# Patient Record
Sex: MALE | Race: OTHER | Employment: UNEMPLOYED | URBAN - METROPOLITAN AREA
[De-identification: names, ages, dates, MRNs, and addresses within clinical notes are randomized per-mention and may not be internally consistent; named-entity substitution may affect disease eponyms.]

---

## 2021-12-28 ENCOUNTER — HOSPITAL ENCOUNTER (OUTPATIENT)
Age: 46
Setting detail: OBSERVATION
Discharge: HOME | End: 2021-12-29
Attending: EMERGENCY MEDICINE | Admitting: SURGERY
Payer: MEDICAID

## 2021-12-28 ENCOUNTER — ANESTHESIA (OUTPATIENT)
Dept: OPERATING ROOM | Age: 46
End: 2021-12-28
Payer: MEDICAID

## 2021-12-28 ENCOUNTER — APPOINTMENT (OUTPATIENT)
Dept: CT IMAGING | Age: 46
End: 2021-12-28
Payer: MEDICAID

## 2021-12-28 ENCOUNTER — ANESTHESIA EVENT (OUTPATIENT)
Dept: OPERATING ROOM | Age: 46
End: 2021-12-28
Payer: MEDICAID

## 2021-12-28 VITALS
DIASTOLIC BLOOD PRESSURE: 64 MMHG | TEMPERATURE: 98.2 F | OXYGEN SATURATION: 100 % | SYSTOLIC BLOOD PRESSURE: 111 MMHG | RESPIRATION RATE: 15 BRPM

## 2021-12-28 DIAGNOSIS — K35.20 ACUTE APPENDICITIS WITH GENERALIZED PERITONITIS, WITHOUT GANGRENE OR ABSCESS, UNSPECIFIED WHETHER PERFORATION PRESENT: Primary | ICD-10-CM

## 2021-12-28 DIAGNOSIS — D72.829 LEUKOCYTOSIS, UNSPECIFIED TYPE: ICD-10-CM

## 2021-12-28 PROBLEM — K35.80 ACUTE APPENDICITIS: Status: ACTIVE | Noted: 2021-12-28

## 2021-12-28 LAB
ALBUMIN SERPL-MCNC: 4.7 G/DL (ref 3.4–5)
ALP BLD-CCNC: 123 U/L (ref 40–129)
ALT SERPL-CCNC: 71 U/L (ref 10–40)
ANION GAP SERPL CALCULATED.3IONS-SCNC: 14 MMOL/L (ref 3–16)
AST SERPL-CCNC: 31 U/L (ref 15–37)
BASOPHILS ABSOLUTE: 0 K/UL (ref 0–0.2)
BASOPHILS RELATIVE PERCENT: 0.3 %
BILIRUB SERPL-MCNC: 0.3 MG/DL (ref 0–1)
BILIRUBIN DIRECT: <0.2 MG/DL (ref 0–0.3)
BILIRUBIN URINE: NEGATIVE
BILIRUBIN, INDIRECT: ABNORMAL MG/DL (ref 0–1)
BLOOD, URINE: ABNORMAL
BUN BLDV-MCNC: 14 MG/DL (ref 7–20)
CALCIUM SERPL-MCNC: 9.4 MG/DL (ref 8.3–10.6)
CHLORIDE BLD-SCNC: 101 MMOL/L (ref 99–110)
CLARITY: ABNORMAL
CO2: 25 MMOL/L (ref 21–32)
COLOR: YELLOW
CREAT SERPL-MCNC: 0.8 MG/DL (ref 0.9–1.3)
EOSINOPHILS ABSOLUTE: 0 K/UL (ref 0–0.6)
EOSINOPHILS RELATIVE PERCENT: 0.2 %
EPITHELIAL CELLS, UA: NORMAL /HPF (ref 0–5)
GFR AFRICAN AMERICAN: >60
GFR NON-AFRICAN AMERICAN: >60
GLUCOSE BLD-MCNC: 108 MG/DL (ref 70–99)
GLUCOSE URINE: NEGATIVE MG/DL
HCT VFR BLD CALC: 41.4 % (ref 40.5–52.5)
HEMOGLOBIN: 13.9 G/DL (ref 13.5–17.5)
KETONES, URINE: NEGATIVE MG/DL
LEUKOCYTE ESTERASE, URINE: NEGATIVE
LIPASE: 18 U/L (ref 13–60)
LYMPHOCYTES ABSOLUTE: 1.3 K/UL (ref 1–5.1)
LYMPHOCYTES RELATIVE PERCENT: 8.5 %
MCH RBC QN AUTO: 28.5 PG (ref 26–34)
MCHC RBC AUTO-ENTMCNC: 33.6 G/DL (ref 31–36)
MCV RBC AUTO: 84.7 FL (ref 80–100)
MICROSCOPIC EXAMINATION: YES
MONOCYTES ABSOLUTE: 1.1 K/UL (ref 0–1.3)
MONOCYTES RELATIVE PERCENT: 7.1 %
NEUTROPHILS ABSOLUTE: 12.5 K/UL (ref 1.7–7.7)
NEUTROPHILS RELATIVE PERCENT: 83.9 %
NITRITE, URINE: NEGATIVE
PDW BLD-RTO: 12.9 % (ref 12.4–15.4)
PH UA: 6 (ref 5–8)
PLATELET # BLD: 263 K/UL (ref 135–450)
PMV BLD AUTO: 10 FL (ref 5–10.5)
POTASSIUM REFLEX MAGNESIUM: 3.8 MMOL/L (ref 3.5–5.1)
PROTEIN UA: 100 MG/DL
RBC # BLD: 4.89 M/UL (ref 4.2–5.9)
RBC UA: NORMAL /HPF (ref 0–4)
SARS-COV-2, NAAT: NOT DETECTED
SODIUM BLD-SCNC: 140 MMOL/L (ref 136–145)
SPECIFIC GRAVITY UA: >=1.03 (ref 1–1.03)
TOTAL PROTEIN: 7.9 G/DL (ref 6.4–8.2)
TRICHOMONAS: NORMAL /HPF
URINE REFLEX TO CULTURE: ABNORMAL
URINE TYPE: ABNORMAL
UROBILINOGEN, URINE: 0.2 E.U./DL
WBC # BLD: 14.9 K/UL (ref 4–11)
WBC UA: NORMAL /HPF (ref 0–5)

## 2021-12-28 PROCEDURE — 2720000010 HC SURG SUPPLY STERILE: Performed by: SURGERY

## 2021-12-28 PROCEDURE — 6360000002 HC RX W HCPCS: Performed by: EMERGENCY MEDICINE

## 2021-12-28 PROCEDURE — 6370000000 HC RX 637 (ALT 250 FOR IP): Performed by: SURGERY

## 2021-12-28 PROCEDURE — 94761 N-INVAS EAR/PLS OXIMETRY MLT: CPT

## 2021-12-28 PROCEDURE — 3600000014 HC SURGERY LEVEL 4 ADDTL 15MIN: Performed by: SURGERY

## 2021-12-28 PROCEDURE — 2580000003 HC RX 258: Performed by: ANESTHESIOLOGY

## 2021-12-28 PROCEDURE — 7100000000 HC PACU RECOVERY - FIRST 15 MIN: Performed by: SURGERY

## 2021-12-28 PROCEDURE — 85025 COMPLETE CBC W/AUTO DIFF WBC: CPT

## 2021-12-28 PROCEDURE — 2580000003 HC RX 258: Performed by: EMERGENCY MEDICINE

## 2021-12-28 PROCEDURE — 2580000003 HC RX 258: Performed by: SURGERY

## 2021-12-28 PROCEDURE — G0378 HOSPITAL OBSERVATION PER HR: HCPCS

## 2021-12-28 PROCEDURE — 2580000003 HC RX 258: Performed by: PHYSICIAN ASSISTANT

## 2021-12-28 PROCEDURE — 36415 COLL VENOUS BLD VENIPUNCTURE: CPT

## 2021-12-28 PROCEDURE — 3600000004 HC SURGERY LEVEL 4 BASE: Performed by: SURGERY

## 2021-12-28 PROCEDURE — 99283 EMERGENCY DEPT VISIT LOW MDM: CPT

## 2021-12-28 PROCEDURE — 2500000003 HC RX 250 WO HCPCS: Performed by: SURGERY

## 2021-12-28 PROCEDURE — 96374 THER/PROPH/DIAG INJ IV PUSH: CPT

## 2021-12-28 PROCEDURE — 74177 CT ABD & PELVIS W/CONTRAST: CPT

## 2021-12-28 PROCEDURE — 80076 HEPATIC FUNCTION PANEL: CPT

## 2021-12-28 PROCEDURE — 80048 BASIC METABOLIC PNL TOTAL CA: CPT

## 2021-12-28 PROCEDURE — 6360000002 HC RX W HCPCS: Performed by: SURGERY

## 2021-12-28 PROCEDURE — 2500000003 HC RX 250 WO HCPCS

## 2021-12-28 PROCEDURE — 88304 TISSUE EXAM BY PATHOLOGIST: CPT

## 2021-12-28 PROCEDURE — 2500000003 HC RX 250 WO HCPCS: Performed by: ANESTHESIOLOGY

## 2021-12-28 PROCEDURE — 3700000000 HC ANESTHESIA ATTENDED CARE: Performed by: SURGERY

## 2021-12-28 PROCEDURE — 6360000002 HC RX W HCPCS: Performed by: ANESTHESIOLOGY

## 2021-12-28 PROCEDURE — 44970 LAPAROSCOPY APPENDECTOMY: CPT | Performed by: SURGERY

## 2021-12-28 PROCEDURE — 81001 URINALYSIS AUTO W/SCOPE: CPT

## 2021-12-28 PROCEDURE — 2709999900 HC NON-CHARGEABLE SUPPLY: Performed by: SURGERY

## 2021-12-28 PROCEDURE — 83690 ASSAY OF LIPASE: CPT

## 2021-12-28 PROCEDURE — 3700000001 HC ADD 15 MINUTES (ANESTHESIA): Performed by: SURGERY

## 2021-12-28 PROCEDURE — 99219 PR INITIAL OBSERVATION CARE/DAY 50 MINUTES: CPT | Performed by: SURGERY

## 2021-12-28 PROCEDURE — 6360000004 HC RX CONTRAST MEDICATION: Performed by: EMERGENCY MEDICINE

## 2021-12-28 PROCEDURE — 87635 SARS-COV-2 COVID-19 AMP PRB: CPT

## 2021-12-28 PROCEDURE — 7100000001 HC PACU RECOVERY - ADDTL 15 MIN: Performed by: SURGERY

## 2021-12-28 PROCEDURE — 6360000004 HC RX CONTRAST MEDICATION: Performed by: PHYSICIAN ASSISTANT

## 2021-12-28 RX ORDER — ROCURONIUM BROMIDE 10 MG/ML
INJECTION, SOLUTION INTRAVENOUS PRN
Status: DISCONTINUED | OUTPATIENT
Start: 2021-12-28 | End: 2021-12-28 | Stop reason: SDUPTHER

## 2021-12-28 RX ORDER — ACETAMINOPHEN 325 MG/1
650 TABLET ORAL EVERY 6 HOURS
Status: DISCONTINUED | OUTPATIENT
Start: 2021-12-28 | End: 2021-12-29 | Stop reason: HOSPADM

## 2021-12-28 RX ORDER — OXYCODONE HYDROCHLORIDE 10 MG/1
10 TABLET ORAL EVERY 4 HOURS PRN
Status: DISCONTINUED | OUTPATIENT
Start: 2021-12-28 | End: 2021-12-29 | Stop reason: HOSPADM

## 2021-12-28 RX ORDER — ONDANSETRON 4 MG/1
4 TABLET, ORALLY DISINTEGRATING ORAL EVERY 8 HOURS PRN
Status: DISCONTINUED | OUTPATIENT
Start: 2021-12-28 | End: 2021-12-29 | Stop reason: HOSPADM

## 2021-12-28 RX ORDER — IBUPROFEN 600 MG/1
600 TABLET ORAL EVERY 6 HOURS
Status: DISCONTINUED | OUTPATIENT
Start: 2021-12-29 | End: 2021-12-29 | Stop reason: HOSPADM

## 2021-12-28 RX ORDER — SODIUM CHLORIDE 0.9 % (FLUSH) 0.9 %
5-40 SYRINGE (ML) INJECTION EVERY 12 HOURS SCHEDULED
Status: DISCONTINUED | OUTPATIENT
Start: 2021-12-28 | End: 2021-12-29 | Stop reason: HOSPADM

## 2021-12-28 RX ORDER — 0.9 % SODIUM CHLORIDE 0.9 %
1000 INTRAVENOUS SOLUTION INTRAVENOUS ONCE
Status: COMPLETED | OUTPATIENT
Start: 2021-12-28 | End: 2021-12-28

## 2021-12-28 RX ORDER — PROPOFOL 10 MG/ML
INJECTION, EMULSION INTRAVENOUS PRN
Status: DISCONTINUED | OUTPATIENT
Start: 2021-12-28 | End: 2021-12-28 | Stop reason: SDUPTHER

## 2021-12-28 RX ORDER — ONDANSETRON 2 MG/ML
INJECTION INTRAMUSCULAR; INTRAVENOUS PRN
Status: DISCONTINUED | OUTPATIENT
Start: 2021-12-28 | End: 2021-12-28 | Stop reason: SDUPTHER

## 2021-12-28 RX ORDER — POTASSIUM CHLORIDE 7.45 MG/ML
10 INJECTION INTRAVENOUS PRN
Status: DISCONTINUED | OUTPATIENT
Start: 2021-12-28 | End: 2021-12-29 | Stop reason: HOSPADM

## 2021-12-28 RX ORDER — KETOROLAC TROMETHAMINE 30 MG/ML
INJECTION, SOLUTION INTRAMUSCULAR; INTRAVENOUS PRN
Status: DISCONTINUED | OUTPATIENT
Start: 2021-12-28 | End: 2021-12-28 | Stop reason: SDUPTHER

## 2021-12-28 RX ORDER — ONDANSETRON 2 MG/ML
4 INJECTION INTRAMUSCULAR; INTRAVENOUS EVERY 6 HOURS PRN
Status: DISCONTINUED | OUTPATIENT
Start: 2021-12-28 | End: 2021-12-29 | Stop reason: HOSPADM

## 2021-12-28 RX ORDER — BUPIVACAINE HYDROCHLORIDE 5 MG/ML
INJECTION, SOLUTION EPIDURAL; INTRACAUDAL
Status: COMPLETED | OUTPATIENT
Start: 2021-12-28 | End: 2021-12-28

## 2021-12-28 RX ORDER — SODIUM CHLORIDE 9 MG/ML
25 INJECTION, SOLUTION INTRAVENOUS PRN
Status: DISCONTINUED | OUTPATIENT
Start: 2021-12-28 | End: 2021-12-29 | Stop reason: HOSPADM

## 2021-12-28 RX ORDER — SODIUM CHLORIDE 9 MG/ML
INJECTION, SOLUTION INTRAVENOUS CONTINUOUS PRN
Status: DISCONTINUED | OUTPATIENT
Start: 2021-12-28 | End: 2021-12-28 | Stop reason: SDUPTHER

## 2021-12-28 RX ORDER — SENNA AND DOCUSATE SODIUM 50; 8.6 MG/1; MG/1
1 TABLET, FILM COATED ORAL 2 TIMES DAILY
Status: DISCONTINUED | OUTPATIENT
Start: 2021-12-28 | End: 2021-12-29 | Stop reason: HOSPADM

## 2021-12-28 RX ORDER — OXYCODONE HYDROCHLORIDE 5 MG/1
5 TABLET ORAL EVERY 4 HOURS PRN
Status: DISCONTINUED | OUTPATIENT
Start: 2021-12-28 | End: 2021-12-29 | Stop reason: HOSPADM

## 2021-12-28 RX ORDER — MORPHINE SULFATE 2 MG/ML
2 INJECTION, SOLUTION INTRAMUSCULAR; INTRAVENOUS
Status: DISCONTINUED | OUTPATIENT
Start: 2021-12-28 | End: 2021-12-29 | Stop reason: HOSPADM

## 2021-12-28 RX ORDER — MAGNESIUM HYDROXIDE 1200 MG/15ML
LIQUID ORAL CONTINUOUS PRN
Status: COMPLETED | OUTPATIENT
Start: 2021-12-28 | End: 2021-12-28

## 2021-12-28 RX ORDER — KETOROLAC TROMETHAMINE 30 MG/ML
15 INJECTION, SOLUTION INTRAMUSCULAR; INTRAVENOUS ONCE
Status: COMPLETED | OUTPATIENT
Start: 2021-12-28 | End: 2021-12-28

## 2021-12-28 RX ORDER — DEXTROSE, SODIUM CHLORIDE, AND POTASSIUM CHLORIDE 5; .45; .15 G/100ML; G/100ML; G/100ML
INJECTION INTRAVENOUS CONTINUOUS
Status: DISCONTINUED | OUTPATIENT
Start: 2021-12-28 | End: 2021-12-29 | Stop reason: HOSPADM

## 2021-12-28 RX ORDER — SODIUM CHLORIDE 0.9 % (FLUSH) 0.9 %
5-40 SYRINGE (ML) INJECTION PRN
Status: DISCONTINUED | OUTPATIENT
Start: 2021-12-28 | End: 2021-12-29 | Stop reason: HOSPADM

## 2021-12-28 RX ORDER — DEXAMETHASONE SODIUM PHOSPHATE 4 MG/ML
INJECTION, SOLUTION INTRA-ARTICULAR; INTRALESIONAL; INTRAMUSCULAR; INTRAVENOUS; SOFT TISSUE PRN
Status: DISCONTINUED | OUTPATIENT
Start: 2021-12-28 | End: 2021-12-28 | Stop reason: SDUPTHER

## 2021-12-28 RX ORDER — MAGNESIUM SULFATE 1 G/100ML
1000 INJECTION INTRAVENOUS PRN
Status: DISCONTINUED | OUTPATIENT
Start: 2021-12-28 | End: 2021-12-29 | Stop reason: HOSPADM

## 2021-12-28 RX ORDER — FENTANYL CITRATE 50 UG/ML
INJECTION, SOLUTION INTRAMUSCULAR; INTRAVENOUS PRN
Status: DISCONTINUED | OUTPATIENT
Start: 2021-12-28 | End: 2021-12-28 | Stop reason: SDUPTHER

## 2021-12-28 RX ORDER — MORPHINE SULFATE 4 MG/ML
4 INJECTION, SOLUTION INTRAMUSCULAR; INTRAVENOUS
Status: DISCONTINUED | OUTPATIENT
Start: 2021-12-28 | End: 2021-12-29 | Stop reason: HOSPADM

## 2021-12-28 RX ADMIN — SUGAMMADEX 300 MG: 100 INJECTION, SOLUTION INTRAVENOUS at 18:49

## 2021-12-28 RX ADMIN — IOHEXOL 50 ML: 240 INJECTION, SOLUTION INTRATHECAL; INTRAVASCULAR; INTRAVENOUS; ORAL at 13:41

## 2021-12-28 RX ADMIN — ACETAMINOPHEN 650 MG: 325 TABLET ORAL at 21:00

## 2021-12-28 RX ADMIN — SODIUM CHLORIDE: 9 INJECTION, SOLUTION INTRAVENOUS at 18:02

## 2021-12-28 RX ADMIN — SENNOSIDES AND DOCUSATE SODIUM 1 TABLET: 50; 8.6 TABLET ORAL at 21:00

## 2021-12-28 RX ADMIN — MORPHINE SULFATE 2 MG: 2 INJECTION, SOLUTION INTRAMUSCULAR; INTRAVENOUS at 21:00

## 2021-12-28 RX ADMIN — SODIUM CHLORIDE 1000 ML: 9 INJECTION, SOLUTION INTRAVENOUS at 13:58

## 2021-12-28 RX ADMIN — IOPAMIDOL 100 ML: 755 INJECTION, SOLUTION INTRAVENOUS at 14:51

## 2021-12-28 RX ADMIN — KETOROLAC TROMETHAMINE 30 MG: 30 INJECTION, SOLUTION INTRAMUSCULAR at 18:39

## 2021-12-28 RX ADMIN — DEXAMETHASONE SODIUM PHOSPHATE 8 MG: 4 INJECTION, SOLUTION INTRAMUSCULAR; INTRAVENOUS at 18:11

## 2021-12-28 RX ADMIN — FENTANYL CITRATE 100 MCG: 50 INJECTION INTRAMUSCULAR; INTRAVENOUS at 18:05

## 2021-12-28 RX ADMIN — SODIUM CHLORIDE: 9 INJECTION, SOLUTION INTRAVENOUS at 18:39

## 2021-12-28 RX ADMIN — ONDANSETRON 4 MG: 2 INJECTION INTRAMUSCULAR; INTRAVENOUS at 18:37

## 2021-12-28 RX ADMIN — KETOROLAC TROMETHAMINE 15 MG: 30 INJECTION, SOLUTION INTRAMUSCULAR at 13:58

## 2021-12-28 RX ADMIN — PIPERACILLIN AND TAZOBACTAM 3375 MG: 3; .375 INJECTION, POWDER, LYOPHILIZED, FOR SOLUTION INTRAVENOUS at 16:15

## 2021-12-28 RX ADMIN — POTASSIUM CHLORIDE, DEXTROSE MONOHYDRATE AND SODIUM CHLORIDE: 150; 5; 450 INJECTION, SOLUTION INTRAVENOUS at 21:03

## 2021-12-28 RX ADMIN — ROCURONIUM BROMIDE 30 MG: 10 SOLUTION INTRAVENOUS at 18:19

## 2021-12-28 RX ADMIN — ROCURONIUM BROMIDE 40 MG: 10 SOLUTION INTRAVENOUS at 18:05

## 2021-12-28 RX ADMIN — SODIUM CHLORIDE, PRESERVATIVE FREE 10 ML: 5 INJECTION INTRAVENOUS at 21:04

## 2021-12-28 RX ADMIN — PROPOFOL 200 MG: 10 INJECTION, EMULSION INTRAVENOUS at 18:05

## 2021-12-28 ASSESSMENT — PULMONARY FUNCTION TESTS
PIF_VALUE: 1
PIF_VALUE: 14
PIF_VALUE: 13
PIF_VALUE: 14
PIF_VALUE: 16
PIF_VALUE: 16
PIF_VALUE: 0
PIF_VALUE: 16
PIF_VALUE: 24
PIF_VALUE: 24
PIF_VALUE: 6
PIF_VALUE: 24
PIF_VALUE: 22
PIF_VALUE: 21
PIF_VALUE: 24
PIF_VALUE: 14
PIF_VALUE: 17
PIF_VALUE: 13
PIF_VALUE: 2
PIF_VALUE: 14
PIF_VALUE: 23
PIF_VALUE: 13
PIF_VALUE: 24
PIF_VALUE: 22
PIF_VALUE: 16
PIF_VALUE: 12
PIF_VALUE: 16
PIF_VALUE: 16
PIF_VALUE: 2
PIF_VALUE: 13
PIF_VALUE: 22
PIF_VALUE: 16
PIF_VALUE: 24
PIF_VALUE: 23
PIF_VALUE: 23
PIF_VALUE: 16
PIF_VALUE: 13
PIF_VALUE: 14
PIF_VALUE: 20
PIF_VALUE: 16
PIF_VALUE: 14
PIF_VALUE: 13
PIF_VALUE: 13
PIF_VALUE: 17
PIF_VALUE: 24
PIF_VALUE: 24
PIF_VALUE: 22

## 2021-12-28 ASSESSMENT — PAIN SCALES - GENERAL
PAINLEVEL_OUTOF10: 0
PAINLEVEL_OUTOF10: 0
PAINLEVEL_OUTOF10: 7

## 2021-12-28 ASSESSMENT — ENCOUNTER SYMPTOMS
COUGH: 0
SHORTNESS OF BREATH: 0
DIARRHEA: 0
VOMITING: 0
CONSTIPATION: 0
CHEST TIGHTNESS: 0
ABDOMINAL PAIN: 1
BLOOD IN STOOL: 0
NAUSEA: 0

## 2021-12-28 ASSESSMENT — PAIN DESCRIPTION - LOCATION
LOCATION: ABDOMEN
LOCATION: ABDOMEN

## 2021-12-28 ASSESSMENT — PAIN DESCRIPTION - PAIN TYPE
TYPE: SURGICAL PAIN
TYPE: ACUTE PAIN

## 2021-12-28 ASSESSMENT — LIFESTYLE VARIABLES: SMOKING_STATUS: 1

## 2021-12-28 ASSESSMENT — PAIN - FUNCTIONAL ASSESSMENT: PAIN_FUNCTIONAL_ASSESSMENT: 0-10

## 2021-12-28 ASSESSMENT — PAIN DESCRIPTION - DESCRIPTORS: DESCRIPTORS: SORE

## 2021-12-28 NOTE — H&P
General Surgery History & Physical      Percy Rodriguez   : 1975 MRN: [id number]  Date of Admission: 2021  Admitting Zina Lyon MD  Primary Care Physician: No primary care provider on file. Diagnosis acute appendicitis    History of Present Illness  Hector Banuelos is a 55 y.o. male presents with a 1 day history of abdominal pain. Pain started last evening at 11pm, located in his epigastrium and then moving down to his right lower quadrant. His pain is located there currently. No nausea, vomiting, fevers, chills. Never had anything like this before. No prior abdominal surgeries. No medical problems. History reviewed. No pertinent past medical history. History reviewed. No pertinent surgical history. History reviewed. No pertinent family history. Social History     Socioeconomic History    Marital status:      Spouse name: Not on file    Number of children: Not on file    Years of education: Not on file    Highest education level: Not on file   Occupational History    Not on file   Tobacco Use    Smoking status: Not on file    Smokeless tobacco: Not on file    Tobacco comment: didn't ask   Substance and Sexual Activity    Alcohol use: Not on file     Comment: didn't ask    Drug use: Not on file     Comment: didn't ask    Sexual activity: Not on file   Other Topics Concern    Not on file   Social History Narrative    Not on file     Social Determinants of Health     Financial Resource Strain:     Difficulty of Paying Living Expenses: Not on file   Food Insecurity:     Worried About [address] in the Last Year: Not on file    [address] in the Last Year: Not on file   Transportation Needs:     Lack of Transportation (Medical): Not on file    Lack of Transportation (Non-Medical):  Not on file   Physical Activity:     Days of Exercise per Week: Not on file    Minutes of Exercise per Session: Not on file   Stress:     Feeling of Stress : Not on file   Social Connections:     Frequency of Communication with Friends and Family: Not on file    Frequency of Social Gatherings with Friends and Family: Not on file    Attends Sikhism Services: Not on file    Active Member of Clubs or Organizations: Not on file    Attends Club or Organization Meetings: Not on file    Marital Status: Not on file   Intimate Partner Violence:     Fear of Current or Ex-Partner: Not on file    Emotionally Abused: Not on file    Physically Abused: Not on file    Sexually Abused: Not on file   Housing Stability:     Unable to Pay for Housing in the Last Year: Not on file    Number of [place] in the Last Year: Not on file    Unstable Housing in the Last Year: Not on file     No Known Allergies  Prior to Admission medications    Not on File         Review of Systems  Pertinent positives are in HPI, otherwise all systems reviewed and negative    Physical Exam  Vitals:    12/28/21 1615   BP: (!) 147/85   Pulse: 77   Resp: 18   Temp: 98.4 °F (36.9 °C)   SpO2: 98%       General/Appearance: NAD, alert and oriented  HEENT: PERRLA, Conjunctiva non injected, no scleral icterus. Mucous membranes pink and moist.  Neck is symmetrical. Trachea appears midline. Lung: normal respiratory effort, no accessory muscle use  Cardiac: regular rate and rhythm  Abdomen: soft, ND, moderate RLQ tenderness with focal peritonitis  Neuro: No gross motor or sensory deficits. Skin: No open wounds or rashes. Labs  Recent Labs     12/28/21  1355   WBC 14.9*   HGB 13.9   HCT 41.4        Recent Labs     12/28/21  1355      K 3.8      CO2 25   BUN 14   GFRAA >60     Recent Labs     12/28/21  1355   AST 31   ALT 71*     Invalid input(s): [facility], [facility], [facility], [place], [facility], [place], [place], [place], [place], [facility]    Imaging  CT ABDOMEN PELVIS W IV CONTRAST Additional Contrast? Oral   Final Result   Acute appendicitis. No free air or periappendiceal abscess.       Diffuse hepatic steatosis. I discussed the findings by phone with the nurse practitioner, Donaldo Tineo   at 3:13 pm on 12/28/2021. RECOMMENDATIONS:   Unavailable            CT abd/pelvis personally reviewed, which shows a dilated and inflamed appendix consistent with acute appendicitis    Assessment  [place]ception Constance Schroeder is a 55 y.o. male who presents with acute appendicitis    Plan    1. Acute appendicitis  · Will proceed to the OR for laparoscopic appendectomy, possible open. The risks, benefits, and alternatives were discussed with the patient and he is willing to proceed. · Abx - IV zosyn given  · Bilateral SCDs  · NPO  · covid negative  · Will plan to admit overnight, and discharge home in morning is surgery is uneventful. Our conversation was performed using a video .     Royann Epley, MD

## 2021-12-28 NOTE — ANESTHESIA PRE PROCEDURE
[facility] Department of Anesthesiology  Pre-Anesthesia Evaluation/Consultation       Name:  Scarlett Cruz  : 1975  Age:  55 y.o. MRN:  [id number]  Date: 2021           Surgeon: Surgeon(s):  Willian Rivas MD    Procedure: Procedure(s):  LAPAROSCOPIC APPENDECTOMY, POSSIBLE OPEN     No Known Allergies  Patient Active Problem List   Diagnosis    Acute appendicitis     History reviewed. No pertinent past medical history. History reviewed. No pertinent surgical history. Social History     Tobacco Use    Smoking status: Not on file    Smokeless tobacco: Not on file    Tobacco comment: didn't ask   Substance Use Topics    Alcohol use: Not on file     Comment: didn't ask    Drug use: Not on file     Comment: didn't ask     Medications  No current facility-administered medications on file prior to encounter. No current outpatient medications on file prior to encounter. No current facility-administered medications for this encounter.      Vital Signs (Current)   Vitals:    21 1316 21 1615   BP: (!) 160/96 (!) 147/85   Pulse: 81 77   Resp: 16 18   Temp: 97.8 °F (36.6 °C) 98.4 °F (36.9 °C)   TempSrc: Oral    SpO2: 98% 98%   Weight: 190 lb 11.2 oz (86.5 kg)    Height: 5' 6\" (1.676 m)                                             Vital Signs Statistics (for past 48 hrs)     Temp  Av.1 °F (36.7 °C)  Min: 97.8 °F (36.6 °C)   Min taken time: 21 131  Max: 98.4 °F (36.9 °C)   Max taken time: 21 161  Pulse  Av  Min: 77   Min taken time: 21 1615  Max: 81   Max taken time: 21 1316  Resp  Av  Min: 16   Min taken time: 21 131  Max: 18   Max taken time: 21 161  BP  Min: 147/85   Min taken time: 21 161  Max: 160/96   Max taken time: 21 1316  MAP (mmHg)  Av  Min: 100   Min taken time: 21 161  Max: 100   Max taken time: 21 1615  SpO2  Av %  Min: 98 %   Min taken time: 12/28/21 1615  Max: 98 %   Max taken time: 12/28/21 1615  BP Readings from Last 3 Encounters:   12/28/21 (!) 147/85       BMI  Body mass index is 30.78 kg/m². Estimated body mass index is 30.78 kg/m² as calculated from the following:    Height as of this encounter: 5' 6\" (1.676 m). Weight as of this encounter: 190 lb 11.2 oz (86.5 kg).     CBC   Lab Results   Component Value Date    WBC 14.9 12/28/2021    RBC 4.89 12/28/2021    HGB 13.9 12/28/2021    HCT 41.4 12/28/2021    MCV 84.7 12/28/2021    RDW 12.9 12/28/2021     12/28/2021     CMP    Lab Results   Component Value Date     12/28/2021    K 3.8 12/28/2021     12/28/2021    CO2 25 12/28/2021    BUN 14 12/28/2021    CREATININE 0.8 12/28/2021    GFRAA >60 12/28/2021    LABGLOM >60 12/28/2021    GLUCOSE 108 12/28/2021    PROT 7.9 12/28/2021    CALCIUM 9.4 12/28/2021    BILITOT 0.3 12/28/2021    ALKPHOS 123 12/28/2021    AST 31 12/28/2021    ALT 71 12/28/2021     BMP    Lab Results   Component Value Date     12/28/2021    K 3.8 12/28/2021     12/28/2021    CO2 25 12/28/2021    BUN 14 12/28/2021    CREATININE 0.8 12/28/2021    CALCIUM 9.4 12/28/2021    GFRAA >60 12/28/2021    LABGLOM >60 12/28/2021    GLUCOSE 108 12/28/2021     POCGlucose  Recent Labs     12/28/21  1355   GLUCOSE 108*      Coags  No results found for: PROTIME, INR, APTT  HCG (If Applicable) No results found for: PREGTESTUR, PREGSERUM, HCG, HCGQUANT   ABGs No results found for: PHART, PO2ART, RJS5MUH, PQY3HYF, BEART, P0YQQKRD   Type & Screen (If Applicable)  No results found for: LABABO, LABRH                         BMI: Wt Readings from Last 3 Encounters:       NPO Status:                          Anesthesia Evaluation  Patient summary reviewed no history of anesthetic complications:   Airway: Mallampati: II  TM distance: >3 FB   Neck ROM: full  Mouth opening: > = 3 FB Dental: normal exam         Pulmonary: breath sounds clear to auscultation  (+) current

## 2021-12-28 NOTE — ED PROVIDER NOTES
Date of evaluation: 12/28/2021    ED Attending Attestation Note     CHIEF COMPLAINT     I was having some pain in my abdomen yesterday around the middle and then it got worse today but now it is in the right lower side  HISTORY OF PRESENT ILLNESS  (Location/Symptom, Timing/Onset,Context/Setting, Quality, Duration, Modifying Factors, Severity). Note limiting factors. This patient was seen by the advance practice provider. I have seen and examined the patient, agree with the workup, evaluation, management and diagnosis. The care plan has been discussed. Chief Complaint   Patient presents with    Abdominal Pain     upper abd pain x1day        Erle Buerger is a 55 y.o. male who presents to the emergency department secondary to concern for abdominal pain as noted above. No nausea, vomiting, fevers, chills. No chest pain. No prior history of abdominal surgeries. No past medical history noted below, he denies any known history. Social History     Socioeconomic History    Marital status:      Spouse name: None    Number of children: None    Years of education: None    Highest education level: None   Occupational History    None   Tobacco Use    Smoking status: None    Smokeless tobacco: None    Tobacco comment: didn't ask   Substance and Sexual Activity    Alcohol use: None     Comment: didn't ask    Drug use: None     Comment: didn't ask    Sexual activity: None   Other Topics Concern    None   Social History Narrative    None     Social Determinants of Health     Financial Resource Strain:     Difficulty of Paying Living Expenses: Not on file   Food Insecurity:     Worried About Running Out of Food in the Last Year: Not on file    Elyssa of Food in the Last Year: Not on file   Transportation Needs:     Lack of Transportation (Medical): Not on file    Lack of Transportation (Non-Medical):  Not on file   Physical Activity:     Days of Exercise per Week: Not on file    Minutes of Exercise per Session: Not on file   Stress:     Feeling of Stress : Not on file   Social Connections:     Frequency of Communication with Friends and Family: Not on file    Frequency of Social Gatherings with Friends and Family: Not on file    Attends Rastafarian Services: Not on file    Active Member of Clubs or Organizations: Not on file    Attends Club or Organization Meetings: Not on file    Marital Status: Not on file   Intimate Partner Violence:     Fear of Current or Ex-Partner: Not on file    Emotionally Abused: Not on file    Physically Abused: Not on file    Sexually Abused: Not on file   Housing Stability:     Unable to Pay for Housing in the Last Year: Not on file    Number of [place] in the Last Year: Not on file    Unstable Housing in the Last Year: Not on file     Aside from what is stated above denies any other symptoms or modifying factors. Nursing Notes reviewed. History reviewed. No pertinent surgical history. History reviewed. No pertinent family history. CURRENT MEDICATIONS       Previous Medications    No medications on file      DIAGNOSTIC RESULTS     RADIOLOGY:   Interpretation per Radiologist below, if available at the time of this note:  CT ABDOMEN PELVIS W IV CONTRAST Additional Contrast? Oral   Final Result   Acute appendicitis. No free air or periappendiceal abscess. Diffuse hepatic steatosis. I discussed the findings by phone with the nurse practitioner, Renetta Rossi   at 3:13 pm on 12/28/2021.       RECOMMENDATIONS:   Unavailable           Patient was given the following medications:  Orders Placed This Encounter   Medications    0.9 % sodium chloride bolus    iohexol (OMNIPAQUE 240) injection 50 mL    iopamidol (ISOVUE-370) 76 % injection 100 mL    ketorolac (TORADOL) injection 15 mg    piperacillin-tazobactam (ZOSYN) 3,375 mg in dextrose 5 % 50 mL IVPB (mini-bag)     Order Specific Question:   Antimicrobial Indications     Answer: Other     Order Specific Question:   Other Abx Indication     Answer:   tony       INITIAL VITALS: BP: (!) 160/96, Temp: 97.8 °F (36.6 °C), Pulse: 81, Resp: 16, SpO2: 98 %     My assessment reveals a male sitting up in bed who has mild discomfort with rebound tenderness in the right lower quadrant on exam, no rigidity or guarding. He does have a positive Rovsing sign as well. Concern on exam is highest for potential appendicitis. Peripheral IV was placed, labs were ordered along with IV fluids and Toradol. CT scan shows acute appendicitis. Zosyn was ordered. General surgery was consulted by CHARLIE and plan will be for admission. Of note patient is primarily Sudanese-speaking. As this is my first language and I am a certified  I perform my exam and history with him in [facility] [place] Critical Care:  Due to the immediate potential for life-threatening deterioration due to appendicitis, I spent 22 minutes providing critical care. This time excludes time spent performing procedures but includes time spent on direct patient care, history retrieval, review of the chart, and discussions with patient, family, and consultant(s). FINAL IMPRESSION      1. Acute appendicitis with generalized peritonitis, without gangrene or abscess, unspecified whether perforation present    2.  Leukocytosis, unspecified type        DISPOSITION/PLAN   DISPOSITION Decision To Admit 12/28/2021 03:18:47 PM             (Please note that portions of this note were completed with a voice recognition program. Efforts were made to edit the dictations but occasionally words are mis-transcribed.)    Musa Marquez MD (electronically signed)  Attending Emergency Physician        Musa Marquez MD  12/28/21 [id number]

## 2021-12-28 NOTE — OP NOTE
Laparoscopic Appendectomy Operative Report      Patient: Celeste Santo MRN: [id number]     YOB: 1975  Age: 55 y.o. Sex: male        Primary Care Physician: No primary care provider on file. DATE OF OPERATION: 12/28/21    PREOPERATIVE DIAGNOSIS: acute appendicitis    POSTOPERATIVE DIAGNOSIS: acute appendicitis - non-perforated    PROCEDURE PERFORMED: Laparoscopic appendectomy     SURGEON: Kellen Nick MD, MD    ANESTHESIA: GETA with local anesthetic. FINDINGS: dilated and inflamed appendix without evidence of perforation. ASA CLASS: 2E    ANTIBIOTICS: therapeutic zosyn IV. DVT PROPHYLAXIS: Bilateral pneumatic compression boots. INDICATIONS:  The patient came to the emergency department with acute onset of generalized   abdominal pain that localized to the right lower quadrant. History, physical exam and CAT scan confirmed acute appendicitis. As a result, the risks, benefits, and alternatives of appendectomy were discussed at length including bleeding, infection, injury to other organs and conversion to open. All questions were answered and the patient was agreeable to proceed. PROCEDURE:   The patient was brought to the operating suite, placed in a   supine position on the operating room table. General anesthesia was induced   which he tolerated well. The abdomen then prepped and draped in the usual sterile fashion and a timeout was performed. Pneumoperitoneum was established via Veress needle through an supraumbilical incision. A 5 mm trocar was then inserted and the laparoscope followed. No injury from Veress needle placement was encountered. As a result, additional 5 mm port was placed in the suprapubic area and another 10 mm port in the left lower quadrant. We turned our attention then back to the right lower   quadrant and there was moderate inflammation noted. The appendix itself was identified and noted to be enlarged/inflamed.  We first took down the

## 2021-12-28 NOTE — PROGRESS NOTES
Pt alert on arrival to pre-op. Visitor left pt. Dr. Omar Fox present and speaking with pt using video .

## 2021-12-28 NOTE — ED NOTES
[id number]   # [id number]    Reviewed process to go to [address] [place] [address],[address] to surgery. IVPB infused and saline lock wrapped with coban. Dr Orquidea Rios ok with pt traveling to [address] [place] [address],[address] with IV in place. Pain RLQ area at 7. Pt able to move around easily and dressing himself. Once IVPB finished, pt left with his daughter. All questions answered. To [facility] by private car.      Yanet Quesada RN  12/28/21 [id number]

## 2021-12-28 NOTE — ED NOTES
[id number] report called to eun in surgery. Also called main admitting and emergency triage desk to let them know that pt is coming and needs to go directly to surgery. Pt calling his family for a ride.      Juliana Juan RN  12/28/21 [id number]

## 2021-12-28 NOTE — ED PROVIDER NOTES
[address] [facility] ENCOUNTER        Pt Name: Shell Milton  MRN: [id number]  [place] 1975  Date of evaluation: 12/28/2021  Provider: KIRK Zaman  PCP: No primary care provider on file. Note Started: 1:27 PM EST        I have seen and evaluated this patient with my supervising physician Klaey Art MD.    [facility]       Chief Complaint   Patient presents with    Abdominal Pain     upper abd pain x1day       HISTORY OF PRESENT ILLNESS   (Location, Timing/Onset, Context/Setting, Quality, Duration, Modifying Factors, Severity, Associated Signs and Symptoms)  Note limiting factors. Chief Complaint: abdominal pain     Doron Jenkins is a 55 y.o. male who presents to the ED with complaints of abdominal pain. He speaks Salvadorean, so  # [id number] it utilized to obtain HPI, ROS, PE. He states that he has abdominal pain located in his upper middle and lower right sided abdominal pain. Pain started around 2300 yesterday evening. He states the pain is a sharp pain, painful to the touch. He has not tried taking anything to treat his symptoms. He denies nausea, vomiting, diarrhea, fevers. Denies urinary symptoms. Denies previous abdominal surgeries. Last oral intake was yesterday evening around 2200. Denies any chronic medical conditions, or use of chronic medications. Last bowel movement was yesterday, normal in appearance with no bloody or black color noted. He does not drink alcohol. Has had one vaccine against covid, received first dose 12/20. He has no further complaints at this time    Nursing Notes were all reviewed and agreed with or any disagreements were addressed in the HPI. REVIEW OF SYSTEMS    (2-9 systems for level 4, 10 or more for level 5)     Review of Systems   Constitutional: Negative for chills and fever. Respiratory: Negative for cough, chest tightness and shortness of breath.     Cardiovascular: Negative for chest pain and palpitations. Gastrointestinal: Positive for abdominal pain. Negative for blood in stool, constipation, diarrhea, nausea and vomiting. Genitourinary: Negative for dysuria, frequency and urgency. Positives and Pertinent negatives as per HPI. Except as noted above in the ROS, all other systems were reviewed and negative. PAST MEDICAL HISTORY   History reviewed. No pertinent past medical history. SURGICAL HISTORY   History reviewed. No pertinent surgical history. CURRENTMEDICATIONS       Previous Medications    No medications on file       ALLERGIES     Patient has no known allergies. FAMILYHISTORY     History reviewed. No pertinent family history. SOCIAL HISTORY       Social History     Tobacco Use    Smoking status: Not on file    Smokeless tobacco: Not on file    Tobacco comment: didn't ask   Substance Use Topics    Alcohol use: Not on file     Comment: didn't ask    Drug use: Not on file     Comment: didn't ask       SCREENINGS             PHYSICAL EXAM    (up to 7 for level 4, 8 or more for level 5)     ED Triage Vitals [12/28/21 1316]   BP Temp Temp Source Pulse Resp SpO2 Height Weight   (!) 160/96 97.8 °F (36.6 °C) Oral 81 16 98 % 5' 6\" (1.676 m) 190 lb 11.2 oz (86.5 kg)       Physical Exam  Vitals and nursing note reviewed. Constitutional:       General: He is not in acute distress. Appearance: He is well-developed. He is not ill-appearing, toxic-appearing or diaphoretic. HENT:      Head: Normocephalic and atraumatic. Eyes:      Conjunctiva/sclera: Conjunctivae normal.      Pupils: Pupils are equal, round, and reactive to light. Cardiovascular:      Rate and Rhythm: Normal rate and regular rhythm. Heart sounds: Normal heart sounds. Pulmonary:      Effort: Pulmonary effort is normal. No respiratory distress. Abdominal:      General: Bowel sounds are normal. There is no distension. Palpations: Abdomen is soft. There is no mass. Tenderness: There is abdominal tenderness in the right lower quadrant and epigastric area. There is no guarding. Skin:     General: Skin is warm and dry. Neurological:      General: No focal deficit present. Mental Status: He is alert and oriented to person, place, and time. Psychiatric:         Behavior: Behavior normal. Behavior is cooperative. Thought Content:  Thought content normal.         DIAGNOSTIC RESULTS   LABS:    Labs Reviewed   BASIC METABOLIC PANEL W/ REFLEX TO MG FOR LOW K - Abnormal; Notable for the following components:       Result Value    Glucose 108 (*)     CREATININE 0.8 (*)     All other components within normal limits    Narrative:     Performed at:  [facility]  [address] [place], [place]   Phone [phone]   HEPATIC FUNCTION PANEL - Abnormal; Notable for the following components:    ALT 71 (*)     All other components within normal limits    Narrative:     Performed at:  [facility]  [address] [place], [place]   Phone [phone]   URINE RT REFLEX TO CULTURE - Abnormal; Notable for the following components:    Clarity, UA SL CLOUDY (*)     Blood, Urine TRACE-INTACT (*)     Protein,  (*)     All other components within normal limits    Narrative:     Performed at:  [facility]  [address] [place], [place]   Phone [phone]   CBC WITH AUTO DIFFERENTIAL - Abnormal; Notable for the following components:    WBC 14.9 (*)     Neutrophils Absolute 12.5 (*)     All other components within normal limits    Narrative:     Performed at:  [facility]  [address] [place], [place]   Phone [phone], RAPID   LIPASE    Narrative:     Performed at:  [facility]  [address] [place] [address]., [facility]   Phone [phone]   MICROSCOPIC URINALYSIS    Narrative:     Performed at:  [facility]  [address] [facility]   Phone [phone]       When ordered only abnormal lab results are displayed. All other labs were within normal range or not returned as of this dictation. EKG: When ordered, EKG's are interpreted by the Emergency Department Physician in the absence of a cardiologist.  Please see their note for interpretation of EKG. RADIOLOGY:   Non-plain film images such as CT, Ultrasound and MRI are read by the radiologist. Plain radiographic images are visualized and preliminarily interpreted by the ED Provider with the below findings:    Interpretation per the Radiologist below, if available at the time of this note:    CT ABDOMEN PELVIS W IV CONTRAST Additional Contrast? Oral   Final Result   Acute appendicitis. No free air or periappendiceal abscess. Diffuse hepatic steatosis. I discussed the findings by phone with the nurse practitioner, Babatunde Tate   at 3:13 pm on 12/28/2021. RECOMMENDATIONS:   Unavailable           No results found.         PROCEDURES   Unless otherwise noted below, none     Procedures    CRITICAL CARE TIME   N/A    CONSULTS:  IP CONSULT TO GENERAL SURGERY      EMERGENCY DEPARTMENT COURSE and DIFFERENTIAL DIAGNOSIS/MDM:   Vitals:    Vitals:    12/28/21 1316   BP: (!) 160/96   Pulse: 81   Resp: 16   Temp: 97.8 °F (36.6 °C)   TempSrc: Oral   SpO2: 98%   Weight: 190 lb 11.2 oz (86.5 kg)   Height: 5' 6\" (1.676 m)       Patient was given the following medications:  Medications   piperacillin-tazobactam (ZOSYN) 3,375 mg in dextrose 5 % 50 mL IVPB (mini-bag) (has no administration in time range)   0.9 % sodium chloride bolus (0 mLs IntraVENous Stopped 12/28/21 1525)   iohexol (OMNIPAQUE 240) injection 50 mL (50 mLs Oral Given 12/28/21 1341)   iopamidol (ISOVUE-370) 76 % injection 100 mL (100 mLs IntraVENous Given 12/28/21 1451)   ketorolac (TORADOL) injection 15 mg (15 mg IntraVENous Given 12/28/21 8458)           ED COURSE & MEDICAL DECISION MAKING    - The patient presented to the ER with complaints of abdominal pain. Vital signs were reviewed. Exam as above. Peripheral IV placed. Labs, Imaging ordered. - Pertinent Labs & Imaging studies reviewed. (See chart for details)   -  Patient seen and evaluated in the emergency department. -  Triage and nursing notes reviewed and incorporated. -  Old chart records reviewed and incorporated. -   I have seen and evaluated this patient with my supervising physician Deidra Rodriguez MD.  -  Differential diagnosis includes: kidney stone, pyelonephritis, UTI, appendicitis, bowel obstruction, diverticulitis, hernia, gastritis/gastroenteritis, pancreatitis, cholecystitis, hepatitis, constipation, IBS, IBD  -  Work-up included:  See above  -  ED treatment included:  toradol, IV fluids, zosyn  - Consults: General surgery - Dr Ange Gomez spoke with Emerson Villasenor PA-C who advised that Dr Nichole Epps will accept and will admit the patient to their service. -  Results discussed with patient and/or family. Labs show leukocytosis of 14.9, no concerning anemia. Metabolic panel with no concerning abnormalities. Lipase is not elevated at 18. .  Urine shows no evidence of infection. Imaging studies show evidence of acute appendicitis with no evidence of perforation or abscess. At this time, we recommend admission, as the patient has an acute appendicitis that will need further evaluation management as an inpatient.  The patient and/or family is agreeable with plan of care and disposition.  -  Disposition:   Admission  - Critical Care: Because of high probability of sudden clinical deterioration of the patient's condition or  further deterioration, critical care time included my full attention to the patient's condition, including chart data review, documentation, medication

## 2021-12-29 VITALS
SYSTOLIC BLOOD PRESSURE: 132 MMHG | HEIGHT: 66 IN | BODY MASS INDEX: 30.08 KG/M2 | HEART RATE: 76 BPM | TEMPERATURE: 98.6 F | RESPIRATION RATE: 17 BRPM | WEIGHT: 187.17 LBS | OXYGEN SATURATION: 97 % | DIASTOLIC BLOOD PRESSURE: 76 MMHG

## 2021-12-29 PROCEDURE — 6370000000 HC RX 637 (ALT 250 FOR IP): Performed by: SURGERY

## 2021-12-29 PROCEDURE — APPNB45 APP NON BILLABLE 31-45 MINUTES: Performed by: PHYSICIAN ASSISTANT

## 2021-12-29 PROCEDURE — 2580000003 HC RX 258: Performed by: SURGERY

## 2021-12-29 PROCEDURE — 99024 POSTOP FOLLOW-UP VISIT: CPT | Performed by: PHYSICIAN ASSISTANT

## 2021-12-29 PROCEDURE — APPSS45 APP SPLIT SHARED TIME 31-45 MINUTES: Performed by: PHYSICIAN ASSISTANT

## 2021-12-29 PROCEDURE — 2500000003 HC RX 250 WO HCPCS: Performed by: SURGERY

## 2021-12-29 PROCEDURE — 6360000002 HC RX W HCPCS: Performed by: SURGERY

## 2021-12-29 PROCEDURE — G0378 HOSPITAL OBSERVATION PER HR: HCPCS

## 2021-12-29 PROCEDURE — 96372 THER/PROPH/DIAG INJ SC/IM: CPT

## 2021-12-29 RX ORDER — POTASSIUM CHLORIDE AND SODIUM CHLORIDE 450; 150 MG/100ML; MG/100ML
INJECTION, SOLUTION INTRAVENOUS
Status: DISCONTINUED
Start: 2021-12-29 | End: 2021-12-29 | Stop reason: HOSPADM

## 2021-12-29 RX ORDER — OXYCODONE HYDROCHLORIDE 5 MG/1
5 TABLET ORAL EVERY 4 HOURS PRN
Qty: 15 TABLET | Refills: 0 | Status: SHIPPED | OUTPATIENT
Start: 2021-12-29 | End: 2022-01-05

## 2021-12-29 RX ORDER — SODIUM CHLORIDE AND POTASSIUM CHLORIDE .9; .15 G/100ML; G/100ML
SOLUTION INTRAVENOUS
Status: DISCONTINUED
Start: 2021-12-29 | End: 2021-12-29 | Stop reason: WASHOUT

## 2021-12-29 RX ADMIN — SODIUM CHLORIDE, PRESERVATIVE FREE 10 ML: 5 INJECTION INTRAVENOUS at 08:27

## 2021-12-29 RX ADMIN — IBUPROFEN 600 MG: 600 TABLET ORAL at 13:24

## 2021-12-29 RX ADMIN — ENOXAPARIN SODIUM 40 MG: 100 INJECTION SUBCUTANEOUS at 08:27

## 2021-12-29 RX ADMIN — IBUPROFEN 600 MG: 600 TABLET ORAL at 06:45

## 2021-12-29 RX ADMIN — POTASSIUM CHLORIDE, DEXTROSE MONOHYDRATE AND SODIUM CHLORIDE: 150; 5; 450 INJECTION, SOLUTION INTRAVENOUS at 07:54

## 2021-12-29 RX ADMIN — IBUPROFEN 600 MG: 600 TABLET ORAL at 01:01

## 2021-12-29 RX ADMIN — ACETAMINOPHEN 650 MG: 325 TABLET ORAL at 08:27

## 2021-12-29 RX ADMIN — OXYCODONE 5 MG: 5 TABLET ORAL at 01:01

## 2021-12-29 RX ADMIN — SENNOSIDES AND DOCUSATE SODIUM 1 TABLET: 50; 8.6 TABLET ORAL at 08:27

## 2021-12-29 ASSESSMENT — PAIN DESCRIPTION - DESCRIPTORS
DESCRIPTORS: ACHING
DESCRIPTORS: ACHING

## 2021-12-29 ASSESSMENT — PAIN DESCRIPTION - ONSET
ONSET: ON-GOING
ONSET: ON-GOING

## 2021-12-29 ASSESSMENT — PAIN DESCRIPTION - PAIN TYPE
TYPE: SURGICAL PAIN
TYPE: SURGICAL PAIN

## 2021-12-29 ASSESSMENT — PAIN DESCRIPTION - LOCATION
LOCATION: ABDOMEN
LOCATION: ABDOMEN

## 2021-12-29 ASSESSMENT — PAIN - FUNCTIONAL ASSESSMENT
PAIN_FUNCTIONAL_ASSESSMENT: PREVENTS OR INTERFERES SOME ACTIVE ACTIVITIES AND ADLS
PAIN_FUNCTIONAL_ASSESSMENT: ACTIVITIES ARE NOT PREVENTED

## 2021-12-29 ASSESSMENT — PAIN DESCRIPTION - FREQUENCY
FREQUENCY: CONTINUOUS
FREQUENCY: CONTINUOUS

## 2021-12-29 ASSESSMENT — PAIN SCALES - GENERAL
PAINLEVEL_OUTOF10: 4
PAINLEVEL_OUTOF10: 2
PAINLEVEL_OUTOF10: 0
PAINLEVEL_OUTOF10: 0

## 2021-12-29 ASSESSMENT — PAIN DESCRIPTION - PROGRESSION
CLINICAL_PROGRESSION: GRADUALLY IMPROVING
CLINICAL_PROGRESSION: GRADUALLY IMPROVING

## 2021-12-29 ASSESSMENT — PAIN DESCRIPTION - ORIENTATION: ORIENTATION: LOWER;RIGHT

## 2021-12-29 NOTE — CARE COORDINATION
SW attempted to reach patient via room phone with interpretor on the line to review discharge needs, however he did not answer the room or his cell. SW will visit bedside momentarily as nurse indicates that he has an  device in his room. Respectfully submitted,    ALCON Galeas  [facility]   [phone]    Electronically signed by ALCON Gaming on 12/29/2021 at 1:51 PM

## 2021-12-29 NOTE — PROGRESS NOTES
Pt admitted to room 4119 from PACU. Pt alert and oriented x 4, VSS. Admission and shift assessment complete using  via video call. Fall risks screening completed. Orientation to room performed and instructions were provided for call light system. Call light and bedside table within pt reach. Will continue to monitor.

## 2021-12-29 NOTE — PROGRESS NOTES
No orders in place for this pt. Call made for Juancarlos Lopez to obtain orders. Awaiting for response.

## 2021-12-29 NOTE — CARE COORDINATION
SW met with patient and family at beside today regarding discharge needs. 2/3 family members speak Zonia Buckner. They stated that he had no insurance and were unaware that he was pending for medicaid. SW informed that our financial counselor helped him apply and will be in touch via mail if she needs anything to support the application. Patient does not need a work note. Family will transport patient home. No further needs noted at this time. Respectfully submitted,    ALCON Galeas  [facility]   [phone]    Electronically signed by ALCON Garcia on 12/29/2021 at 2:25 PM

## 2021-12-29 NOTE — PROGRESS NOTES
4 Eyes Skin Assessment     NAME:  Carlos Bynum  YOB: 1975  MEDICAL RECORD NUMBER:  [id number]    The patient is being assess for  Admission    I agree that 2 RN's have performed a thorough Head to Toe Skin Assessment on the patient. ALL assessment sites listed below have been assessed. Areas assessed by both nurses:    Head, Face, Ears, Shoulders, Back, Chest, Arms, Elbows, Hands, Sacrum. Buttock, Coccyx, Ischium and Legs. Feet and Heels        Does the Patient have a Wound?  No noted wound(s)      Matthew Prevention initiated:  No   Wound Care Orders initiated:  No    Pressure Injury (Stage 3,4, Unstageable, DTI, NWPT, and Complex wounds) if present place consult order under [de-identified] NA    New and Established Ostomies if present place consult order under : NA      Nurse 1 eSignature: Electronically signed by Estrella Garrett RN on 12/29/21 at 7:37 AM EST    Nurse 2 eSignature: Electronically signed by Shu Green RN on 12/29/21 at 7:49 AM EST

## 2021-12-29 NOTE — PROGRESS NOTES
General and Vascular Surgery                                                           Daily Progress Note                                                             Champ Beltran PA-C     Pt Name: Linda Schwartz Record Number: [id number]  Date of Birth 1975   Today's Date: 12/29/2021    ASSESSMENT/PLAN  POD#1: Laparoscopic appendectomy  1. Pain controlled  2. Incision sites look good  3. Denies any nasuea or emesis  4. Tolerating diet  5. OK to D/C home  6. F/U with Dr. Beata Aguero in 1-2 weeks    EDUCATION  Patient educated about their illness/diagnosis, stated above, and all questions answered. We discussed the importance of nutrition, medications they are taking, and healthy lifestyle. SUBJECTIVE  Robert has improved from yesterday. Pain is well controlled. He has no nausea and no vomiting. OBJECTIVE  VITALS:  height is 5' 6\" (1.676 m) and weight is 187 lb 2.7 oz (84.9 kg). His oral temperature is 98.6 °F (37 °C). His blood pressure is 132/76 and his pulse is 76. His respiration is 17 and oxygen saturation is 97%. VITALS:  /76   Pulse 76   Temp 98.6 °F (37 °C) (Oral)   Resp 17   Ht 5' 6\" (1.676 m)   Wt 187 lb 2.7 oz (84.9 kg)   SpO2 97%   BMI 30.21 kg/m²   GENERAL: alert, no distress  ABDOMEN: non-distended and tenderness present- mild incisional,  without rebound and guarding  I/O last 3 completed shifts:   In: 1000 [I.V.:1000]  Out: -   I/O this shift:  In: 1064.4 [I.V.:1014.9; IV Piggyback:49.5]  Out: -     LABS  Recent Labs     12/28/21  1345 12/28/21  1355   WBC  --  14.9*   HGB  --  13.9   HCT  --  41.4   PLT  --  263   NA  --  140   K  --  3.8   CL  --  101   CO2  --  25   BUN  --  14   CREATININE  --  0.8*   CALCIUM  --  9.4   AST  --  31   ALT  --  71*   BILITOT  --  0.3   BILIDIR  --  <0.2   NITRU Negative  --    COLORU Yellow  --      CBC:   Lab Results   Component Value Date    WBC 14.9 12/28/2021    RBC 4.89 12/28/2021    HGB 13.9 12/28/2021    HCT 41.4 12/28/2021    MCV 84.7 12/28/2021    MCH 28.5 12/28/2021    MCHC 33.6 12/28/2021    RDW 12.9 12/28/2021     12/28/2021    MPV 10.0 12/28/2021     CMP:    Lab Results   Component Value Date     12/28/2021    K 3.8 12/28/2021     12/28/2021    CO2 25 12/28/2021    BUN 14 12/28/2021    CREATININE 0.8 12/28/2021    GFRAA >60 12/28/2021    LABGLOM >60 12/28/2021    GLUCOSE 108 12/28/2021    PROT 7.9 12/28/2021    LABALBU 4.7 12/28/2021    CALCIUM 9.4 12/28/2021    BILITOT 0.3 12/28/2021    ALKPHOS 123 12/28/2021    AST 31 12/28/2021    ALT 71 12/28/2021         Preston Handley PA-C  Electronically signed 12/29/2021 at 10:57 AM

## 2021-12-29 NOTE — DISCHARGE INSTR - COC
Continuity of Care Form    Patient Name: Marva Summers   :  1975  MRN:  [id number]    Admit date:  2021  Discharge date:  ***    Code Status Order: Full Code   Advance Directives:   Advance Care Flowsheet Documentation       Date/Time Healthcare Directive Type of Healthcare Directive Copy in [address] Agent's Name Healthcare Agent's Phone Number    [phone] Unknown, patient unable to respond due to medical condition -- -- -- -- --            Admitting Physician:  Tamy Bingham MD  PCP: No primary care provider on file. Discharging Nurse: [facility] Unit/Room#: P6Y-8745/4237-41  Discharging Unit Phone Number: [phone]    Emergency Contact:   No emergency contact information on file. Past Surgical History:  Past Surgical History:   Procedure Laterality Date    LAPAROSCOPIC APPENDECTOMY N/A 2021    LAPAROSCOPIC APPENDECTOMY performed by Tamy Bingham MD at [place] [address]       Immunization History: There is no immunization history on file for this patient.     Active Problems:  Patient Active Problem List   Diagnosis Code    Acute appendicitis K35.80       Isolation/Infection:   Isolation            No Isolation          Patient Infection Status       None to display            Nurse Assessment:  Last Vital Signs: /76   Pulse 76   Temp 98.6 °F (37 °C) (Oral)   Resp 17   Ht 5' 6\" (1.676 m)   Wt 187 lb 2.7 oz (84.9 kg)   SpO2 97%   BMI 30.21 kg/m²     Last documented pain score (0-10 scale): Pain Level: 2  Last Weight:   Wt Readings from Last 1 Encounters:   21 187 lb 2.7 oz (84.9 kg)     Mental Status:  Alert Oriented    IV Access:  none    Nursing Mobility/ADLs:  Walking   independent  Transfer independent   Bathing  independent  Dressing independent   Toileting independent   Feeding independent   Med Admin independent  Med Delivery   whole    Wound Care Documentation and Therapy: Elimination:  Continence: Bowel: YES   Bladder: YES   Urinary Catheter: none  Colostomy/Ileostomy/Ileal Conduit:  NO       Date of Last BM:     Intake/Output Summary (Last 24 hours) at 12/29/2021 1101  Last data filed at 12/29/2021 0754  Gross per 24 hour   Intake 2064.43 ml   Output --   Net 2064.43 ml     I/O last 3 completed shifts:   In: 1000 [I.V.:1000]  Out: -     Safety Concerns:     Fall risk    Impairments/Disabilities:      none    Nutrition Therapy:  Current Nutrition Therapy:   General Diet    Routes of Feeding: oral  Liquids: thin  Daily Fluid Restriction  Last Modified Barium Swallow with Video (Video Swallowing Test): not done    Treatments at the Time of Hospital Discharge:   Respiratory Treatments: none  Oxygen Therapy:  none  Ventilator:    none    Rehab Therapies: none  Weight Bearing Status/Restrictions: Wait bearing as tolerated  Other Medical Equipment (for information only, NOT a DME order): none  Other Treatments: none    Patient's personal belongings (please select all that are sent with patient):      RN SIGNATURE:  Maria Guadalupe Tsang RN    CASE MANAGEMENT/SOCIAL WORK SECTION    Inpatient Status Date: ***    Readmission Risk Assessment Score:  Readmission Risk              Risk of Unplanned Readmission:  5           Discharging to Facility/ Agency   Name:   Address:  Phone:  Fax:    Dialysis Facility (if applicable)   Name:  Address:  Dialysis Schedule:  Phone:  Fax:    / signature: {Esignature:[id number]}    PHYSICIAN SECTION    Prognosis: {Prognosis:3145069337}    Condition at Discharge: [facility] Patient Condition:101477325}    Rehab Potential (if transferring to Rehab): {Prognosis:7217941132}    Recommended Labs or Other Treatments After Discharge: ***    Physician Certification: I certify the above information and transfer of Marva Summers  is necessary for the continuing treatment of the diagnosis listed and that he requires {Admit to Appropriate Level of Care:91099} for {GREATER/LESS:965616659} 30 days.      Update Admission H&P: {CHP DME Changes in MWNSO:692796552}    PHYSICIAN SIGNATURE:  {Esignature:[id number]}

## 2021-12-29 NOTE — PROGRESS NOTES
Pt arrived to PACU from OR. Pt asleep on room air. Abd soft. Incisions x3 KIMBERLEE with surgical glue noted.

## 2021-12-29 NOTE — PLAN OF CARE
Problem: Pain:  Goal: Pain level will decrease  Description: Pain level will decrease  Outcome: Ongoing  Goal: Control of acute pain  Description: Control of acute pain  Outcome: Ongoing  Goal: Control of chronic pain  Description: Control of chronic pain  Outcome: Ongoing     Problem: Pain:  Goal: Control of acute pain  Description: Control of acute pain  Outcome: Ongoing

## 2022-01-04 NOTE — DISCHARGE SUMMARY
activity as tolerated    Follow up:  Dr. Silver Shah in 1-2 weeks    Electronically signed by Mishel Schuster PA-C on 1/4/2022 at 12:55 PM

## (undated) DEVICE — SOLUTION IV IRRIG POUR BRL 0.9% SODIUM CHL 2F7124

## (undated) DEVICE — TROCAR: Brand: KII SHIELDED BLADED ACCESS SYSTEM

## (undated) DEVICE — INSUFFLATION NEEDLE TO ESTABLISH PNEUMOPERITONEUM.: Brand: INSUFFLATION NEEDLE

## (undated) DEVICE — GENERAL LAPAROSCOPY: Brand: MEDLINE INDUSTRIES, INC.

## (undated) DEVICE — SUTURE VCRL SZ 4-0 L18IN ABSRB UD L19MM PS-2 3/8 CIR PRIM J496H

## (undated) DEVICE — GOWN,AURORA,NONREINF,RAGLAN,XXL,STERILE: Brand: MEDLINE

## (undated) DEVICE — ADHESIVE SKIN CLOSURE TOP 36 CC HI VISC DERMBND MINI

## (undated) DEVICE — COVER LT HNDL BLU PLAS

## (undated) DEVICE — TROCAR: Brand: KII SLEEVE

## (undated) DEVICE — SET INSUF TUBE HEAT ISO CONN DISP

## (undated) DEVICE — GLOVE ORANGE PI 7 1/2   MSG9075

## (undated) DEVICE — CUTTER ENDOSCP L340MM LIN ARTC SGL STROKE FIRING ENDOPATH

## (undated) DEVICE — PMI DISPOSABLE PUNCTURE CLOSURE DEVICE / SUTURE GRASPER: Brand: PMI

## (undated) DEVICE — HYPODERMIC SAFETY NEEDLE: Brand: MAGELLAN

## (undated) DEVICE — GARMENT COMPR STD FOR 17IN CALF UNIF THER FLOTRN

## (undated) DEVICE — GLOVE SURG SZ 8 L12IN FNGR THK79MIL GRN LTX FREE

## (undated) DEVICE — DBD-DRAPE,LAP,CHOLE,W/TROUGHS,STERILE: Brand: MEDLINE

## (undated) DEVICE — RELOAD STPL SZ 0 L45MM DIA3.5MM 0DEG STD REG TISS BLU TI